# Patient Record
Sex: MALE | Race: WHITE | NOT HISPANIC OR LATINO | URBAN - METROPOLITAN AREA
[De-identification: names, ages, dates, MRNs, and addresses within clinical notes are randomized per-mention and may not be internally consistent; named-entity substitution may affect disease eponyms.]

---

## 2017-03-05 ENCOUNTER — EMERGENCY (EMERGENCY)
Facility: HOSPITAL | Age: 32
LOS: 1 days | Discharge: HOME | End: 2017-03-05
Attending: EMERGENCY MEDICINE | Admitting: EMERGENCY MEDICINE
Payer: COMMERCIAL

## 2017-03-05 VITALS
RESPIRATION RATE: 18 BRPM | HEART RATE: 108 BPM | DIASTOLIC BLOOD PRESSURE: 86 MMHG | SYSTOLIC BLOOD PRESSURE: 136 MMHG | TEMPERATURE: 98 F | OXYGEN SATURATION: 96 %

## 2017-03-05 VITALS
DIASTOLIC BLOOD PRESSURE: 79 MMHG | SYSTOLIC BLOOD PRESSURE: 130 MMHG | OXYGEN SATURATION: 95 % | RESPIRATION RATE: 18 BRPM | HEART RATE: 99 BPM
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
## 2017-03-05 DIAGNOSIS — S82.141A DISPLACED BICONDYLAR FRACTURE OF RIGHT TIBIA, INITIAL ENCOUNTER FOR CLOSED FRACTURE: ICD-10-CM

## 2017-03-05 DIAGNOSIS — M25.561 PAIN IN RIGHT KNEE: ICD-10-CM
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
## 2017-03-05 DIAGNOSIS — Z88.0 ALLERGY STATUS TO PENICILLIN: ICD-10-CM
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
## 2017-03-05 PROCEDURE — 96372 THER/PROPH/DIAG INJ SC/IM: CPT

## 2017-03-05 PROCEDURE — 73562 X-RAY EXAM OF KNEE 3: CPT

## 2017-03-05 PROCEDURE — 99284 EMERGENCY DEPT VISIT MOD MDM: CPT | Mod: 25

## 2017-03-05 PROCEDURE — 73700 CT LOWER EXTREMITY W/O DYE: CPT

## 2017-03-05 PROCEDURE — 73562 X-RAY EXAM OF KNEE 3: CPT | Mod: 26,RT

## 2017-03-05 PROCEDURE — 73700 CT LOWER EXTREMITY W/O DYE: CPT | Mod: 26,RT

## 2017-03-05 PROCEDURE — 73562 X-RAY EXAM OF KNEE 3: CPT | Mod: 26

## 2017-03-05 RX ORDER — KETOROLAC TROMETHAMINE 30 MG/ML
30 SYRINGE (ML) INJECTION ONCE
Qty: 0 | Refills: 0 | Status: DISCONTINUED | OUTPATIENT
Start: 2017-03-05 | End: 2017-03-05

## 2017-03-05 RX ADMIN — Medication 30 MILLIGRAM(S): at 05:07

## 2017-03-05 NOTE — CONSULT NOTE ADULT - ASSESSMENT
A/P: 31M y s/p mechanical fall with right Schatzker II and type 3 tibial eminence fracture/avulsion. Patient is stable, however, he is declining surgery at this time to be able to discuss with family. He plans to follow-up this coming week and was counseled regarding the risks appropriately, which he acknowledged.   -stable  -pain control  -NWB, knee immobilizer, crutches  -f/u with Dr. Gonzales for operative treatment  -discussed with senior resident and Dr. Gonzales, the latter of whom counseled the patient as well
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
## 2017-03-05 NOTE — ED PROVIDER NOTE - PROGRESS NOTE DETAILS
pt evaluated by ortho, pending attending decision for op vs non op Pt evaluated by ortho in ED and recommend f/u as outpt. knee immobilizer and crutches

## 2017-03-07 ENCOUNTER — OUTPATIENT (OUTPATIENT)
Dept: OUTPATIENT SERVICES | Facility: HOSPITAL | Age: 32
LOS: 1 days | Discharge: HOME | End: 2017-03-07
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
## 2017-03-07 DIAGNOSIS — S83.511A SPRAIN OF ANTERIOR CRUCIATE LIGAMENT OF RIGHT KNEE, INITIAL ENCOUNTER: ICD-10-CM

## 2017-03-07 DIAGNOSIS — S82.141A DISPLACED BICONDYLAR FRACTURE OF RIGHT TIBIA, INITIAL ENCOUNTER FOR CLOSED FRACTURE: ICD-10-CM

## 2017-03-07 DIAGNOSIS — Z88.0 ALLERGY STATUS TO PENICILLIN: ICD-10-CM
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
## 2017-03-07 DIAGNOSIS — F41.9 ANXIETY DISORDER, UNSPECIFIED: ICD-10-CM

## 2017-03-07 DIAGNOSIS — K21.9 GASTRO-ESOPHAGEAL REFLUX DISEASE WITHOUT ESOPHAGITIS: ICD-10-CM

## 2017-03-07 DIAGNOSIS — R03.0 ELEVATED BLOOD-PRESSURE READING, WITHOUT DIAGNOSIS OF HYPERTENSION: ICD-10-CM

## 2017-03-07 DIAGNOSIS — S83.271A COMPLEX TEAR OF LATERAL MENISCUS, CURRENT INJURY, RIGHT KNEE, INITIAL ENCOUNTER: ICD-10-CM

## 2017-07-13 NOTE — ED PROVIDER NOTE - CROS ED ROS STATEMENT
INTERVAL HPI/OVERNIGHT EVENTS:  Patient S&E at bedside. No overnight events, patient resting comfortably. No headaches since yesterday morning, slept well. No new complaints, but would like to know if he can go home Friday (1 day early). Patient denies fever, chills, dizziness, weakness, CP, palpitations, SOB, cough, N/V/D/C, dysuria, changes in bowel movements, LE edema, neurologic symptoms (numbness, tingling, weakness). Has been OOB, last BM yesterday.    VITAL SIGNS:  T(F): 98.1 (07-11-17 @ 05:20)  HR: 63 (07-11-17 @ 05:20)  BP: 126/70 (07-11-17 @ 05:20)  RR: 15 (07-11-17 @ 05:20)  SpO2: 99% (07-11-17 @ 05:20)    PHYSICAL EXAM:    Constitutional: WDWN, NAD, sitting in chair writing  Eyes: PERRL, EOMI, sclera non-icteric  Neck: supple  Respiratory: CTA b/l, good air entry b/l, no wheezing, rhonchi, rales, with normal respiratory effort and no intercostal retractions  Cardiovascular: RRR, normal S1S2, no M/R/G  Gastrointestinal: soft, NTND, no masses palpable, BS normal in all four quadrants, no HSM  Extremities: WWP  Skin: Normal temperature  Neurological:  AAOx3, speech fluent, no dysarthria, naming and repetition intact, follows 3 step commands  PERRL, EOMI, VFFC, face symmetric, tongue midline  5/5 x 4  LT intact b/l  DTR symmetric  FTN intact b/l      MEDICATIONS  (STANDING):  doxycycline hyclate Capsule 100 milliGRAM(s) Oral every 12 hours  methylPREDNISolone sodium succinate IVPB 250 milliGRAM(s) IV Intermittent every 6 hours  melatonin 3 milliGRAM(s) Oral once  insulin lispro (HumaLOG) corrective regimen sliding scale   SubCutaneous Before meals and at bedtime  dextrose 5%. 1000 milliLiter(s) (50 mL/Hr) IV Continuous <Continuous>  dextrose 50% Injectable 12.5 Gram(s) IV Push once  dextrose 50% Injectable 25 Gram(s) IV Push once  dextrose 50% Injectable 25 Gram(s) IV Push once  pantoprazole    Tablet 40 milliGRAM(s) Oral before breakfast    MEDICATIONS  (PRN):  LORazepam    IVPB 2 milliGRAM(s) IV Intermittent every 4 hours PRN Seizure  dextrose Gel 1 Dose(s) Oral once PRN Blood Glucose LESS THAN 70 milliGRAM(s)/deciliter  glucagon  Injectable 1 milliGRAM(s) IntraMuscular once PRN Glucose LESS THAN 70 milligrams/deciliter      Allergies    No Known Allergies    Intolerances      VEEG (Prelim) normal wakefulness, drowsiness and sleep.      RADIOLOGY & ADDITIONAL TESTS:  Studies reviewed.    VEEG: normal. no seizures, clinical events or epileptiform discharges. all other ROS negative except as per HPI INTERVAL HPI/OVERNIGHT EVENTS:  Patient S&E at bedside. No overnight events, patient resting comfortably. No headaches since yesterday morning, slept well. No new complaints, but would like to know if he can go home Friday (1 day early). Patient denies fever, chills, dizziness, weakness, CP, palpitations, SOB, cough, N/V/D/C, dysuria, changes in bowel movements, LE edema, neurologic symptoms (numbness, tingling, weakness). Has been OOB, last BM yesterday. All other ROS are negative.    VITAL SIGNS:  T(F): 98.1 (07-11-17 @ 05:20)  HR: 63 (07-11-17 @ 05:20)  BP: 126/70 (07-11-17 @ 05:20)  RR: 15 (07-11-17 @ 05:20)  SpO2: 99% (07-11-17 @ 05:20)    PHYSICAL EXAM:    Constitutional: WDWN, NAD, sitting in chair writing  Eyes: PERRL, EOMI, sclera non-icteric  Neck: supple  Respiratory: CTA b/l, good air entry b/l, no wheezing, rhonchi, rales, with normal respiratory effort and no intercostal retractions  Cardiovascular: RRR, normal S1S2, no M/R/G  Gastrointestinal: soft, NTND, no masses palpable, BS normal in all four quadrants, no HSM  Extremities: WWP  Skin: Normal temperature  Neurological:  AAOx3, speech fluent, no dysarthria, naming and repetition intact, follows 3 step commands  PERRL, EOMI, VFFC, face symmetric, tongue midline  5/5 x 4  LT intact b/l  DTR symmetric  FTN intact b/l      MEDICATIONS  (STANDING):  doxycycline hyclate Capsule 100 milliGRAM(s) Oral every 12 hours  methylPREDNISolone sodium succinate IVPB 250 milliGRAM(s) IV Intermittent every 6 hours  melatonin 3 milliGRAM(s) Oral once  insulin lispro (HumaLOG) corrective regimen sliding scale   SubCutaneous Before meals and at bedtime  dextrose 5%. 1000 milliLiter(s) (50 mL/Hr) IV Continuous <Continuous>  dextrose 50% Injectable 12.5 Gram(s) IV Push once  dextrose 50% Injectable 25 Gram(s) IV Push once  dextrose 50% Injectable 25 Gram(s) IV Push once  pantoprazole    Tablet 40 milliGRAM(s) Oral before breakfast    MEDICATIONS  (PRN):  LORazepam    IVPB 2 milliGRAM(s) IV Intermittent every 4 hours PRN Seizure  dextrose Gel 1 Dose(s) Oral once PRN Blood Glucose LESS THAN 70 milliGRAM(s)/deciliter  glucagon  Injectable 1 milliGRAM(s) IntraMuscular once PRN Glucose LESS THAN 70 milligrams/deciliter      Allergies    No Known Allergies    Intolerances      VEEG (Prelim) normal wakefulness, drowsiness and sleep.      RADIOLOGY & ADDITIONAL TESTS:  Studies reviewed.    VEEG: disconnected yesterday

## 2021-05-19 NOTE — ED PROVIDER NOTE - MEDICAL DECISION MAKING DETAILS
Render Risk Assessment In Note?: no
Other (Free Text): PATIENT PREVIOUSLY WITH INFLAMMATORY CYSTIC ACNE LESION. NOW RESOLVED WITH SLIGHTLY ATROPHIC SCAR. NO PUNCTUM.  ADVISED SURGERY WOULD LEAVE BIGGER SCAR AND GIVEN NO CYST PRESENT LIKELY UNNECESSARY.  IF CYST RECURS IN SAME LOCATION CAN RETURN FOR EXCISION. OTHERWISE F/U WITH DR. CHAIREZ FOR MEDICAL MANAGEMENT OF ACNE.
Detail Level: Zone
Note Text (......Xxx Chief Complaint.): This diagnosis correlates with the
No obvious fx on xray, pt unable to flex or extend without significant pain and unable to bear weight. + suprapatellar effusion on exam, anterior and posterior drawer test nl. Likely meniscal injury. Will CT, discharge in knee immobilizer and ortho follow up for MRI and definitive treatment.